# Patient Record
Sex: MALE | Race: WHITE | NOT HISPANIC OR LATINO | URBAN - METROPOLITAN AREA
[De-identification: names, ages, dates, MRNs, and addresses within clinical notes are randomized per-mention and may not be internally consistent; named-entity substitution may affect disease eponyms.]

---

## 2021-04-01 NOTE — IMPRESSION/PLAN
Impression: Age-related nuclear cataract, bilateral: H25.13. Plan: No treatment currently recommended due to level of vision. Patient will monitor vision changes and contact us with any decrease in vision, will re-evaluate cataract on next visit. Px wants to wait.

## 2021-04-01 NOTE — IMPRESSION/PLAN
Impression: Myopia, bilateral: H52.13. Plan: Discussed diagnosis in detail with patient. New glasses Rx was given today. Recommend yearly exams.

## 2022-04-20 NOTE — IMPRESSION/PLAN
Impression: Age-related nuclear cataract, bilateral: H25.13. Plan: Discussed cataract diagnosis with the patient. Discussed and reviewed treatment options for cataracts. Risks and benefits of surgical procedure were explained and understood. Patient elects surgical treatment. Schedule for consult with Dr. Weston Zelaya or Dr. Conchita Esquivel.

## 2022-04-20 NOTE — IMPRESSION/PLAN
Impression: Myopia, bilateral: H52.13. Plan: Discussed diagnosis in detail with patient.   New glasses Rx was not given today, recommend CAT jacobal.

## 2022-06-06 ENCOUNTER — PRE-OPERATIVE VISIT (OUTPATIENT)
Dept: URBAN - METROPOLITAN AREA CLINIC 64 | Facility: CLINIC | Age: 72
End: 2022-06-06
Payer: MEDICARE

## 2022-06-06 DIAGNOSIS — H25.13 AGE-RELATED NUCLEAR CATARACT, BILATERAL: Primary | ICD-10-CM

## 2022-06-06 DIAGNOSIS — H52.223 REGULAR ASTIGMATISM, BILATERAL: ICD-10-CM

## 2022-06-06 DIAGNOSIS — H35.413 LATTICE DEGENERATION OF RETINA, BILATERAL: ICD-10-CM

## 2022-06-06 PROCEDURE — 99204 OFFICE O/P NEW MOD 45 MIN: CPT | Performed by: OPHTHALMOLOGY

## 2022-06-06 ASSESSMENT — INTRAOCULAR PRESSURE
OD: 12
OS: 17

## 2022-06-06 NOTE — IMPRESSION/PLAN
Impression: Lattice degeneration of retina, bilateral: H35.413. Plan: Discussed. No treatment required. Monitor.

## 2022-06-06 NOTE — IMPRESSION/PLAN
Impression: Age-related nuclear cataract, bilateral: H25.13. Plan: Discussed cataracts, treatment options, and surgical risks/benefits with patient. Patient elects surgical treatment. Recommend surgery OU, OS first. Aim OD: plano,  Aim OS: -0.25 Candidate for all advanced technologies. Recommend ORA. Recommend Pan Optix IOL to reduce reliance on readers. Discussed surgical benefits/risks with patient of Trifocal lens. For Pan Optix IOL Aim OD: plano, Aim OS: plano. 

NOTES: lattice degen OU

## 2022-07-05 NOTE — IMPRESSION/PLAN
Impression: S/P Cataract Extraction by phacoemulsification with IOL placement/ORA OS - . Encounter for surgical aftercare following surgery on a sense organ  Z48.810.  Plan: Excellent post op course   Post operative instructions reviewed -  Advised if there is extreme pain; pt can see me in [place]; otherwise RTC 1 week

## 2022-07-12 NOTE — IMPRESSION/PLAN
Impression: S/P Cataract Extraction by phacoemulsification with IOL placement/ORA OS - 7 Days. Encounter for surgical aftercare following surgery on a sense organ  Z48.810. Plan: Pt doing well, okay for second eye surgery.

## 2022-07-20 NOTE — IMPRESSION/PLAN
Impression: S/P Cataract Extraction by phacoemulsification with IOL placement/ORA/LRI OD - 1 Day. Presence of intraocular lens  Z96.1. Plan: Pt doing well. RTC 1 week for PO with Dr. Champ Negrete. Pt was advised to use OTCR.  Return to clinic in 1 week

## 2022-07-26 NOTE — IMPRESSION/PLAN
Impression: S/P Cataract Extraction by phacoemulsification with IOL placement/ORA/LRI OD - 7 Days. Presence of intraocular lens  Z96.1.  Plan: Pt is doing well, return in two weeks for a refraction

## 2022-08-10 NOTE — IMPRESSION/PLAN
Impression: S/P Cataract Extraction by phacoemulsification with IOL placement/ORA/LRI OD - 22 Days. Presence of intraocular lens  Z96.1. Plan: Pt doing well lens intact and in place, Trace PCO OU. 
Return in 4 months for CE exam

## 2022-12-30 NOTE — IMPRESSION/PLAN
Impression: Other secondary cataract, bilateral: H26.493. Plan: Mild. Treatment not needed at this time. Monitor yearly.